# Patient Record
(demographics unavailable — no encounter records)

---

## 2024-12-17 NOTE — HISTORY OF PRESENT ILLNESS
[Former] : former [< 20 pack-years] : < 20 pack-years [TextBox_4] : My patient since 2016 w adult onset mild persistent asthma, diagnosed by MCT. History of occasional steroid pulses but no hospitalizations for asthma. Former very light smoker of 1-2 cigs per week.  12/17/2024: She comes in for routine follow up on Dulera prn, using it about once a day. No interim exacerbations. In interval was in ED for a reaction to lisinopril w facial swelling - got Benadryl and steroids but no Epipen - was seen at [facility].   [YearQuit] : 2018

## 2024-12-17 NOTE — ASSESSMENT
[FreeTextEntry1] : Data reviewed:  Shahid and FENO always normal MCT 5/19/17: mild-mod BHR, PC20 2.5 Shahid 12/12/19: normal Shahid 12/17/2024: mild restriction, FEV1 79% / FENO 13  Impression: Mild persistent asthma Former minimal smoker  Plan: Cont prn Dulera. Flu shot and PCV20 given. Reviewed LDCT eligible - her smoking hx is too minimal to be eligible. Can return 6 mos per her preference.